# Patient Record
Sex: MALE | Race: WHITE | Employment: FULL TIME | URBAN - METROPOLITAN AREA
[De-identification: names, ages, dates, MRNs, and addresses within clinical notes are randomized per-mention and may not be internally consistent; named-entity substitution may affect disease eponyms.]

---

## 2017-01-03 RX ORDER — HYDROCODONE BITARTRATE AND ACETAMINOPHEN 10; 325 MG/1; MG/1
TABLET ORAL
Qty: 60 TABLET | Refills: 0 | Status: SHIPPED | OUTPATIENT
Start: 2017-01-03 | End: 2017-01-25 | Stop reason: SDUPTHER

## 2017-01-03 RX ORDER — TESTOSTERONE 30 MG/1.5ML
SOLUTION TOPICAL
Qty: 180 G | Refills: 3 | Status: SHIPPED | OUTPATIENT
Start: 2017-01-03

## 2017-01-17 RX ORDER — ZALEPLON 10 MG/1
10 CAPSULE ORAL NIGHTLY PRN
Qty: 15 CAPSULE | Refills: 1 | Status: SHIPPED | OUTPATIENT
Start: 2017-01-17 | End: 2018-07-30

## 2017-01-25 RX ORDER — HYDROCODONE BITARTRATE AND ACETAMINOPHEN 10; 325 MG/1; MG/1
TABLET ORAL
Qty: 60 TABLET | Refills: 0 | Status: SHIPPED | OUTPATIENT
Start: 2017-01-25 | End: 2017-02-21 | Stop reason: SDUPTHER

## 2017-01-26 ENCOUNTER — TELEPHONE (OUTPATIENT)
Dept: FAMILY MEDICINE CLINIC | Age: 47
End: 2017-01-26

## 2017-03-14 RX ORDER — HYDROCODONE BITARTRATE AND ACETAMINOPHEN 10; 325 MG/1; MG/1
TABLET ORAL
Qty: 60 TABLET | Refills: 0 | Status: SHIPPED | OUTPATIENT
Start: 2017-03-14 | End: 2018-01-11 | Stop reason: SDUPTHER

## 2017-03-22 ENCOUNTER — OFFICE VISIT (OUTPATIENT)
Dept: FAMILY MEDICINE CLINIC | Age: 47
End: 2017-03-22

## 2017-03-22 VITALS
WEIGHT: 135 LBS | OXYGEN SATURATION: 97 % | BODY MASS INDEX: 22.49 KG/M2 | HEART RATE: 68 BPM | RESPIRATION RATE: 16 BRPM | HEIGHT: 65 IN | SYSTOLIC BLOOD PRESSURE: 114 MMHG | DIASTOLIC BLOOD PRESSURE: 70 MMHG

## 2017-03-22 DIAGNOSIS — K58.0 IRRITABLE BOWEL SYNDROME WITH DIARRHEA: ICD-10-CM

## 2017-03-22 DIAGNOSIS — G44.221 CHRONIC TENSION-TYPE HEADACHE, INTRACTABLE: ICD-10-CM

## 2017-03-22 DIAGNOSIS — G43.009 MIGRAINE WITHOUT AURA AND WITHOUT STATUS MIGRAINOSUS, NOT INTRACTABLE: Primary | ICD-10-CM

## 2017-03-22 PROCEDURE — 99999 PR OFFICE/OUTPT VISIT,PROCEDURE ONLY: CPT | Performed by: FAMILY MEDICINE

## 2017-03-22 RX ORDER — SUMATRIPTAN 25 MG/1
12.5 TABLET, FILM COATED ORAL 2 TIMES DAILY PRN
Qty: 9 TABLET | Refills: 3 | Status: SHIPPED | OUTPATIENT
Start: 2017-03-22 | End: 2018-07-30

## 2017-03-22 ASSESSMENT — ENCOUNTER SYMPTOMS
VISUAL CHANGE: 0
SINUS PRESSURE: 0
SHORTNESS OF BREATH: 0
CONSTIPATION: 0
PHOTOPHOBIA: 1
EYE PAIN: 1
COUGH: 0
VOMITING: 0
NAUSEA: 0
SORE THROAT: 0
BLURRED VISION: 0
TROUBLE SWALLOWING: 0
WHEEZING: 0
DIARRHEA: 1

## 2017-11-15 PROBLEM — M51.26 DISPLACEMENT OF LUMBAR INTERVERTEBRAL DISC WITHOUT MYELOPATHY: Status: ACTIVE | Noted: 2017-11-15

## 2018-07-30 ENCOUNTER — HOSPITAL ENCOUNTER (EMERGENCY)
Age: 48
Discharge: HOME | End: 2018-07-30
Attending: EMERGENCY MEDICINE
Payer: COMMERCIAL

## 2018-07-30 VITALS
SYSTOLIC BLOOD PRESSURE: 111 MMHG | DIASTOLIC BLOOD PRESSURE: 81 MMHG | RESPIRATION RATE: 14 BRPM | HEART RATE: 65 BPM | WEIGHT: 135 LBS | TEMPERATURE: 97.8 F | HEIGHT: 66 IN | OXYGEN SATURATION: 100 % | BODY MASS INDEX: 21.69 KG/M2

## 2018-07-30 DIAGNOSIS — T40.601A OPIATE OVERDOSE, ACCIDENTAL OR UNINTENTIONAL, INITIAL ENCOUNTER (HCC): ICD-10-CM

## 2018-07-30 DIAGNOSIS — R42 DIZZINESS: ICD-10-CM

## 2018-07-30 DIAGNOSIS — Z98.890 HISTORY OF BACK SURGERY: Primary | ICD-10-CM

## 2018-07-30 DIAGNOSIS — R55 SYNCOPE, UNSPECIFIED SYNCOPE TYPE: ICD-10-CM

## 2018-07-30 LAB
A/G RATIO: 1.5 (ref 1.1–2.2)
ALBUMIN SERPL-MCNC: 4.3 G/DL (ref 3.4–5)
ALP BLD-CCNC: 61 U/L (ref 40–129)
ALT SERPL-CCNC: 19 U/L (ref 10–40)
ANION GAP SERPL CALCULATED.3IONS-SCNC: 12 MMOL/L (ref 3–16)
AST SERPL-CCNC: 22 U/L (ref 15–37)
BASOPHILS ABSOLUTE: 0.1 K/UL (ref 0–0.2)
BASOPHILS RELATIVE PERCENT: 0.6 %
BILIRUB SERPL-MCNC: 0.3 MG/DL (ref 0–1)
BUN BLDV-MCNC: 16 MG/DL (ref 7–20)
CALCIUM SERPL-MCNC: 9.4 MG/DL (ref 8.3–10.6)
CHLORIDE BLD-SCNC: 99 MMOL/L (ref 99–110)
CO2: 27 MMOL/L (ref 21–32)
CREAT SERPL-MCNC: 0.8 MG/DL (ref 0.9–1.3)
EKG ATRIAL RATE: 49 BPM
EKG DIAGNOSIS: NORMAL
EKG P AXIS: 40 DEGREES
EKG P-R INTERVAL: 150 MS
EKG Q-T INTERVAL: 400 MS
EKG QRS DURATION: 84 MS
EKG QTC CALCULATION (BAZETT): 361 MS
EKG R AXIS: 81 DEGREES
EKG T AXIS: 60 DEGREES
EKG VENTRICULAR RATE: 49 BPM
EOSINOPHILS ABSOLUTE: 0.3 K/UL (ref 0–0.6)
EOSINOPHILS RELATIVE PERCENT: 1.9 %
GFR AFRICAN AMERICAN: >60
GFR NON-AFRICAN AMERICAN: >60
GLOBULIN: 2.8 G/DL
GLUCOSE BLD-MCNC: 108 MG/DL (ref 70–99)
HCT VFR BLD CALC: 45.1 % (ref 40.5–52.5)
HEMOGLOBIN: 15.8 G/DL (ref 13.5–17.5)
LYMPHOCYTES ABSOLUTE: 3.3 K/UL (ref 1–5.1)
LYMPHOCYTES RELATIVE PERCENT: 24.7 %
MCH RBC QN AUTO: 30.4 PG (ref 26–34)
MCHC RBC AUTO-ENTMCNC: 35.1 G/DL (ref 31–36)
MCV RBC AUTO: 86.7 FL (ref 80–100)
MONOCYTES ABSOLUTE: 1 K/UL (ref 0–1.3)
MONOCYTES RELATIVE PERCENT: 7.5 %
NEUTROPHILS ABSOLUTE: 8.7 K/UL (ref 1.7–7.7)
NEUTROPHILS RELATIVE PERCENT: 65.3 %
PDW BLD-RTO: 13.1 % (ref 12.4–15.4)
PLATELET # BLD: 215 K/UL (ref 135–450)
PMV BLD AUTO: 8 FL (ref 5–10.5)
POTASSIUM SERPL-SCNC: 3.7 MMOL/L (ref 3.5–5.1)
RBC # BLD: 5.21 M/UL (ref 4.2–5.9)
SODIUM BLD-SCNC: 138 MMOL/L (ref 136–145)
SPECIMEN STATUS: NORMAL
TOTAL PROTEIN: 7.1 G/DL (ref 6.4–8.2)
TROPONIN: <0.01 NG/ML
WBC # BLD: 13.3 K/UL (ref 4–11)

## 2018-07-30 PROCEDURE — 2580000003 HC RX 258: Performed by: EMERGENCY MEDICINE

## 2018-07-30 PROCEDURE — 96374 THER/PROPH/DIAG INJ IV PUSH: CPT

## 2018-07-30 PROCEDURE — 84484 ASSAY OF TROPONIN QUANT: CPT

## 2018-07-30 PROCEDURE — 6360000002 HC RX W HCPCS: Performed by: EMERGENCY MEDICINE

## 2018-07-30 PROCEDURE — 99283 EMERGENCY DEPT VISIT LOW MDM: CPT

## 2018-07-30 PROCEDURE — 96361 HYDRATE IV INFUSION ADD-ON: CPT

## 2018-07-30 PROCEDURE — 93005 ELECTROCARDIOGRAM TRACING: CPT | Performed by: EMERGENCY MEDICINE

## 2018-07-30 PROCEDURE — 85025 COMPLETE CBC W/AUTO DIFF WBC: CPT

## 2018-07-30 PROCEDURE — 93010 ELECTROCARDIOGRAM REPORT: CPT | Performed by: INTERNAL MEDICINE

## 2018-07-30 PROCEDURE — 80053 COMPREHEN METABOLIC PANEL: CPT

## 2018-07-30 RX ORDER — ONDANSETRON 2 MG/ML
4 INJECTION INTRAMUSCULAR; INTRAVENOUS ONCE
Status: COMPLETED | OUTPATIENT
Start: 2018-07-30 | End: 2018-07-30

## 2018-07-30 RX ORDER — ONDANSETRON 4 MG/1
4 TABLET, ORALLY DISINTEGRATING ORAL EVERY 8 HOURS PRN
Qty: 16 TABLET | Refills: 0 | Status: SHIPPED | OUTPATIENT
Start: 2018-07-30

## 2018-07-30 RX ORDER — 0.9 % SODIUM CHLORIDE 0.9 %
1000 INTRAVENOUS SOLUTION INTRAVENOUS ONCE
Status: COMPLETED | OUTPATIENT
Start: 2018-07-30 | End: 2018-07-30

## 2018-07-30 RX ADMIN — SODIUM CHLORIDE 1000 ML: 9 INJECTION, SOLUTION INTRAVENOUS at 20:11

## 2018-07-30 RX ADMIN — ONDANSETRON 4 MG: 2 SOLUTION INTRAMUSCULAR; INTRAVENOUS at 20:11

## 2018-07-30 ASSESSMENT — PAIN DESCRIPTION - ORIENTATION: ORIENTATION: LOWER

## 2018-07-30 ASSESSMENT — PAIN SCALES - GENERAL
PAINLEVEL_OUTOF10: 7
PAINLEVEL_OUTOF10: 4

## 2018-07-30 ASSESSMENT — PAIN DESCRIPTION - PAIN TYPE: TYPE: ACUTE PAIN

## 2018-07-30 ASSESSMENT — PAIN DESCRIPTION - LOCATION: LOCATION: BACK

## 2018-07-31 NOTE — ED PROVIDER NOTES
CHIEF COMPLAINT  Post-op Problem (pt had something done to his back today (stem cell) and was given medication. pt states that he has been passing out. )      HISTORY OF PRESENT ILLNESS  Armida Montes is a 53 y.o. male presents to the ED with low back pain and passing out episode this evening, pt states he is a , and was afraid he might have overdosed on his prescribed meds, because he has seen that before, wife here w/ him, she states he had an episode where he was difficult to wake up and seemed like he was having trouble breathing, just had a stem cell procedure on his lumbar spine today, hx of DDD, given percocet 10s to take scheduled and dilaudid 2mg PO to take for breakthrough pain . Still having significant back pain which he expected post procedure, no bowel/bladder incontinence, no urinary retention, no saddle parasthesia, has been ambulating w/ pain, also feeling nauseated, but has not vomited, No other complaints, modifying factors or associated symptoms. I have reviewed the following from the nursing documentation. Past Medical History:   Diagnosis Date    DDD (degenerative disc disease), lumbosacral 1/17/2013    Environmental allergies     Headache(784.0)     Irritable bowel syndrome with diarrhea 3/10/2016    Seasonal allergies 4/27/2011     Past Surgical History:   Procedure Laterality Date    APPENDECTOMY      SHOULDER SURGERY      Right      History reviewed. No pertinent family history. Social History     Social History    Marital status:      Spouse name: N/A    Number of children: N/A    Years of education: N/A     Occupational History    Not on file.      Social History Main Topics    Smoking status: Former Smoker     Types: Cigarettes     Quit date: 8/31/2011    Smokeless tobacco: Current User      Comment: Smokeless tobacco used occasionally     Alcohol use Yes      Comment: occasionally    Drug use: No    Sexual activity: Yes     Other Topics Concern  Not on file     Social History Narrative    No narrative on file     No current facility-administered medications for this encounter. Current Outpatient Prescriptions   Medication Sig Dispense Refill    ondansetron (ZOFRAN ODT) 4 MG disintegrating tablet Take 1 tablet by mouth every 8 hours as needed for Nausea or Vomiting 16 tablet 0    HYDROmorphone (DILAUDID) 2 MG tablet Take 1 tablet by mouth 3 times daily as needed for Pain for up to 7 days. . 21 tablet 0    oxyCODONE-acetaminophen (PERCOCET)  MG per tablet Take 1 tablet by mouth 2 times daily as needed for Pain for up to 18 days. . 36 tablet 0    Testosterone 30 MG/ACT SOLN APPLY TWO CUPS TO EACH UNDERARM ONCE DAILY 180 g 3     Allergies   Allergen Reactions    Sulfa Antibiotics      Hives       REVIEW OF SYSTEMS  10 systems reviewed, pertinent positives per HPI otherwise noted to be negative. PHYSICAL EXAM  /81   Pulse 65   Temp 97.8 °F (36.6 °C) (Oral)   Resp 14   Ht 5' 6\" (1.676 m)   Wt 135 lb (61.2 kg)   SpO2 100%   BMI 21.79 kg/m²   GENERAL APPEARANCE: Awake and alert. Cooperative. No acute distress  HEAD: Normocephalic. Atraumatic. EYES: PERRL. EOM's grossly intact. ENT: Mucous membranes are moist.   NECK: Supple. HEART: RRR. Borderline kristan around 60, No murmurs  LUNGS: Respirations nonlabored. Lungs are CTAB. ABDOMEN: Soft. Non-distended. Non-tender. No guarding or rebound. Normal bowel sounds. EXTREMITIES: No peripheral edema. Moves all extremities equally. All extremities neurovascularly intact. SKIN: Warm and dry. No acute rashes. Back dressing CDI, no bleeding, TTP over lower lumbar spine  NEUROLOGICAL: Alert and oriented. No gross facial drooping. Strength 5/5, sensation intact. No truncal ataxia. nml speech, CN 2-12 grossly intact, finger to nose testing nml b/l. Back: pain w/ any ROM of lumbar spine, TTP over surgical site  PSYCHIATRIC: Normal mood and affect. Pt appears irritable.     LABS  I have  0.9 % sodium chloride bolus    ondansetron (ZOFRAN) injection 4 mg    ondansetron (ZOFRAN ODT) 4 MG disintegrating tablet     Sig: Take 1 tablet by mouth every 8 hours as needed for Nausea or Vomiting     Dispense:  16 tablet     Refill:  0     Plan of care discussed with patient and family. Patient and family in agreement with plan. Patient was given scripts for the following medications. I counseled patient how to take these medications. Discharge Medication List as of 7/30/2018 10:13 PM      START taking these medications    Details   ondansetron (ZOFRAN ODT) 4 MG disintegrating tablet Take 1 tablet by mouth every 8 hours as needed for Nausea or Vomiting, Disp-16 tablet, R-0Print           CLINICAL IMPRESSION  1. History of back surgery    2. Dizziness    3. Syncope, unspecified syncope type    4. Opiate overdose, accidental or unintentional, initial encounter        Blood pressure 111/81, pulse 65, temperature 97.8 °F (36.6 °C), temperature source Oral, resp. rate 14, height 5' 6\" (1.676 m), weight 135 lb (61.2 kg), SpO2 100 %. Jennifer Ta was discharged to home in stable condition.                   Osker Guard, DO  08/13/18 [id number]

## 2019-06-11 ENCOUNTER — OFFICE VISIT (OUTPATIENT)
Dept: ORTHOPEDIC SURGERY | Age: 49
End: 2019-06-11
Payer: COMMERCIAL

## 2019-06-11 VITALS — RESPIRATION RATE: 14 BRPM | WEIGHT: 135 LBS | HEIGHT: 65 IN | BODY MASS INDEX: 22.49 KG/M2

## 2019-06-11 DIAGNOSIS — M54.50 PAIN OF LUMBAR SPINE: ICD-10-CM

## 2019-06-11 DIAGNOSIS — M47.816 SPONDYLOSIS WITHOUT MYELOPATHY OR RADICULOPATHY, LUMBAR REGION: ICD-10-CM

## 2019-06-11 DIAGNOSIS — S39.012A LUMBAR STRAIN, INITIAL ENCOUNTER: ICD-10-CM

## 2019-06-11 DIAGNOSIS — M51.26 HNP (HERNIATED NUCLEUS PULPOSUS), LUMBAR: Primary | ICD-10-CM

## 2019-06-11 PROBLEM — M51.36 BULGING LUMBAR DISC: Status: ACTIVE | Noted: 2018-05-08

## 2019-06-11 PROBLEM — M51.369 BULGING LUMBAR DISC: Status: ACTIVE | Noted: 2018-05-08

## 2019-06-11 PROCEDURE — 99203 OFFICE O/P NEW LOW 30 MIN: CPT | Performed by: PHYSICAL MEDICINE & REHABILITATION

## 2019-06-11 RX ORDER — MELOXICAM 15 MG/1
15 TABLET ORAL DAILY
Qty: 30 TABLET | Refills: 0 | Status: CANCELLED | OUTPATIENT
Start: 2019-06-11

## 2019-06-11 RX ORDER — NAPROXEN 500 MG/1
500 TABLET ORAL 2 TIMES DAILY PRN
Qty: 60 TABLET | Refills: 0 | Status: SHIPPED | OUTPATIENT
Start: 2019-06-11 | End: 2019-07-08 | Stop reason: SDUPTHER

## 2019-06-11 RX ORDER — TEMAZEPAM 15 MG/1
15 CAPSULE ORAL
COMMUNITY
Start: 2018-12-17

## 2019-06-11 RX ORDER — CYCLOBENZAPRINE HCL 10 MG
10 TABLET ORAL EVERY EVENING
Qty: 30 TABLET | Refills: 0 | Status: CANCELLED | OUTPATIENT
Start: 2019-06-11 | End: 2019-07-11

## 2019-06-11 RX ORDER — SUMATRIPTAN 25 MG/1
TABLET, FILM COATED ORAL
COMMUNITY
Start: 2017-03-22

## 2019-06-11 RX ORDER — CYCLOBENZAPRINE HCL 10 MG
10 TABLET ORAL NIGHTLY PRN
Qty: 30 TABLET | Refills: 0 | Status: SHIPPED | OUTPATIENT
Start: 2019-06-11 | End: 2019-06-21

## 2019-06-11 NOTE — PROGRESS NOTES
New Patient: SPINE    Referring Provider:  No ref. provider found    Chief Complaint   Patient presents with    Lower Back Pain     Lower right side        HISTORY OF PRESENT ILLNESS:      · The patient is being sent at the request of No ref. provider found in consultation as a new spine patient for low back pain The patient is a 53 y.o. male whom reports a week and a half of pain. He describes that he felt pain in the lower back when he was moving boxes and pulling objects. He saw Dr. Deanne Davis, and he did not do anything for the pain last week. He describes that he is having a problem straightening the body. He did not have a fall when the pain began. He describes the pain to be an aching pain over the right side of the lumbar spine. His pain has been persistent and constant over the past week and a half. Aggravating factors include bending, standing, and sitting. This has limited his behavior. Relieving factors include ice, heat, and rest.    · He has had a stem cell treatment last fall in the lumbar spine. He has not been treated for this newer pain since the injury.      Pain Assessment  Location of Pain: Back  Location Modifiers: Left, Inferior  Severity of Pain: 7  Quality of Pain: Aching(Radiating, Stabbing)  Duration of Pain: Persistent  Frequency of Pain: Constant  Date Pain First Started: 06/04/19  Aggravating Factors: Bending, Standing, Other (Comment)(Sitting)  Limiting Behavior: Yes  Relieving Factors: Ice, Heat, Rest  Result of Injury: No  Work-Related Injury: No  Are there other pain locations you wish to document?: No      Associated signs and symptoms:   Neurogenic bowel or bladder symptoms:  no   Perceived weakness:  no   Difficulty walking:  yes    Recent Imaging (within past one year)   Xrays: no   MRI or CT of spine: yes    Current/Past Treatment:   · Physical Therapy:  none  · Chiropractic:  none  · Injection:  yes stem cell therapy   · Medications:   NSAIDS:  yes, Ibuprofen and Tylenol Muscle relaxer:  none   Steriods:  none   Neuropathic medications:  none   Opioids:  Oxycodone 5-325 TID  · Previous surgery:  no  · Previous surgical consult:  no  · Other:  · Infection control  · Tested positive for MRSA in past 12 months:  no  · Tested positive for MSSA \"staph infection\" in past 12 months: no  · Tested positive for VRE (Vancomycin Resistant Enterococci) in past 12 months:   no  · Currently on any antibiotics for an infection: no  · Anticoagulants:  · On a blood thinner:  no   · Any history of bleeding disorder: no   · MRI Contraindication: no   · Previous Pain Management: no             Past Medical History:   Past Medical History:   Diagnosis Date    DDD (degenerative disc disease), lumbosacral 1/17/2013    Environmental allergies     Headache(784.0)     Irritable bowel syndrome with diarrhea 3/10/2016    Seasonal allergies 4/27/2011      Past Surgical History:     Past Surgical History:   Procedure Laterality Date    APPENDECTOMY      SHOULDER SURGERY      Right      Current Medications:     Current Outpatient Medications:     temazepam (RESTORIL) 15 MG capsule, Take 15 mg by mouth., Disp: , Rfl:     SUMAtriptan (IMITREX) 25 MG tablet, Take by mouth, Disp: , Rfl:     naproxen (NAPROSYN) 500 MG tablet, Take 1 tablet by mouth 2 times daily as needed for Pain, Disp: 60 tablet, Rfl: 0    cyclobenzaprine (FLEXERIL) 10 MG tablet, Take 1 tablet by mouth nightly as needed for Muscle spasms, Disp: 30 tablet, Rfl: 0    oxyCODONE-acetaminophen (PERCOCET) 5-325 MG per tablet, Take 1 tablet by mouth 3 times daily as needed for Pain for up to 30 days. , Disp: 90 tablet, Rfl: 0    [START ON 6/27/2019] oxyCODONE-acetaminophen (PERCOCET) 5-325 MG per tablet, Take 1 tablet by mouth 3 times daily as needed for Pain for up to 30 days. , Disp: 90 tablet, Rfl: 0    ondansetron (ZOFRAN ODT) 4 MG disintegrating tablet, Take 1 tablet by mouth every 8 hours as needed for Nausea or Vomiting, Disp: 16 tablet, Rfl: 0    Testosterone 30 MG/ACT SOLN, APPLY TWO CUPS TO EACH UNDERARM ONCE DAILY, Disp: 180 g, Rfl: 3  Allergies:  Sulfa antibiotics  Social History:    reports that he quit smoking about 7 years ago. His smoking use included cigarettes. He uses smokeless tobacco. He reports that he drinks alcohol. He reports that he does not use drugs. Family History:   History reviewed. No pertinent family history. REVIEW OF SYSTEMS: Full ROS reviewed & scanned from 6/11/2019           PHYSICAL EXAM:    Vitals: Resp. rate 14, height 5' 5\" (1.651 m), weight 135 lb (61.2 kg). GENERAL EXAM:  · General Apparence: Patient is adequately groomed with no evidence of malnutrition. · Psychiatric: Orientation: The patient is oriented to time, place and person. The patient's mood and affect are appropriate   · Vascular: Examination reveals no swelling and palpation reveals no tenderness in upper or lower extremities. Good capillary refill. · The lymphatic examination of the neck, axillae and groin reveals all areas to be without enlargement or induration   Sensation is intact without deficit in the upper and lower extremities to light touch and pinprick  · Coordination of the upper and lower extremities are normal.  · RIGHT UPPER EXTREMITY:  Inspection/examination of the right upper extremity does not show any tenderness, deformity or injury. Range of motion is normal and pain-free. There is no gross instability. There are no rashes, ulcerations or lesions. Strength and tone are normal. No atrophy or abnormal movements are noted. · LEFT UPPER EXTREMITY: Inspection/examination of the left upper extremity does not show any tenderness, deformity or injury. Range of motion is normal and pain-free. There is no gross instability. There are no rashes, ulcerations or lesions. Strength and tone are normal. No atrophy or abnormal movements are noted.     LUMBAR/SACRAL EXAMINATION:  · Inspection: Local inspection shows no step-off or bruising. Lumbar alignment is normal. No instability is noted. · Palpation:   No evidence of tenderness at the midline. Lumbar paraspinal tenderness Mild L4/5 and L5/S1 tenderness  Bursal tenderness No tenderness bilaterally  There is no paraspinal spasm. · Range of Motion: limited by 25% in all planes due to pain with flexion and right side bending. · Strength:   Strength testing is 5/5 in all muscle groups tested. · Special Tests:   Straight leg raise positive right and negative left and crossed SLR positive left with right sided leg symptoms. Sebastian's testing is negative bilaterally. FADIR's testing is negative bilaterally. · Skin: There are no rashes, ulcerations or lesions. · Reflexes: Reflexes are symmetrically 2+ at the patellar and ankle tendons. Clonus absent bilaterally at the feet. · Gait & station: normal, patient ambulates without assistance  · Additional Examinations:  · RIGHT LOWER EXTREMITY: Inspection/examination of the right lower extremity does not show any tenderness, deformity or injury. Range of motion is unremarkable. There is no gross instability. There are no rashes, ulcerations or lesions. Strength and tone are normal. No atrophy or abnormal movements are noted. · LEFT LOWER EXTREMITY:  Inspection/examination of the left lower extremity does not show any tenderness, deformity or injury. Range of motion is unremarkable. There is no gross instability. There are no rashes, ulcerations or lesions. Strength and tone are normal. No atrophy or abnormal movements are noted. Diagnostic Testing:    Xrays:   AP and lateral of the lumbar spine taken today in the office show L4-5 5 S1 mild degenerative disc disease    MRI or CT:  MRI of the Lumbar Spine from 4/9/18   CONCLUSION:    1.  Concentric disc displacement with annular rent and medializing facet hypertrophy mildly    narrow the L5-S1 and L4-5 lateral recesses with encroachment/effacement of the descending S1    and L5 nerve roots bilaterally. Mild bilateral L5-S1 foraminal stenosis with L5 root    effacement. 2. L3-4 Left foraminal disc protrusion/herniation with annular rent and facet hypertrophy    moderately narrows the left root foramen with L3 root effacement. Mild right foraminal stenosis    with L3 root effacement. Shallow concentric disc displacement and medializing facet    hypertrophy mildly narrow the lateral recesses with encroachment of the descending L4 nerve    roots bilaterally. 3. Multilevel facet hypertrophy with capsulitis most conspicuous at L5-S1 and L4-5.       EMG:  None  Results for orders placed or performed during the hospital encounter of 07/30/18   CBC auto differential   Result Value Ref Range    WBC 13.3 (H) 4.0 - 11.0 K/uL    RBC 5.21 4.20 - 5.90 M/uL    Hemoglobin 15.8 13.5 - 17.5 g/dL    Hematocrit 45.1 40.5 - 52.5 %    MCV 86.7 80.0 - 100.0 fL    MCH 30.4 26.0 - 34.0 pg    MCHC 35.1 31.0 - 36.0 g/dL    RDW 13.1 12.4 - 15.4 %    Platelets 341 244 - 097 K/uL    MPV 8.0 5.0 - 10.5 fL    Neutrophils % 65.3 %    Lymphocytes % 24.7 %    Monocytes % 7.5 %    Eosinophils % 1.9 %    Basophils % 0.6 %    Neutrophils # 8.7 (H) 1.7 - 7.7 K/uL    Lymphocytes # 3.3 1.0 - 5.1 K/uL    Monocytes # 1.0 0.0 - 1.3 K/uL    Eosinophils # 0.3 0.0 - 0.6 K/uL    Basophils # 0.1 0.0 - 0.2 K/uL   Comprehensive metabolic panel   Result Value Ref Range    Sodium 138 136 - 145 mmol/L    Potassium 3.7 3.5 - 5.1 mmol/L    Chloride 99 99 - 110 mmol/L    CO2 27 21 - 32 mmol/L    Anion Gap 12 3 - 16    Glucose 108 (H) 70 - 99 mg/dL    BUN 16 7 - 20 mg/dL    CREATININE 0.8 (L) 0.9 - 1.3 mg/dL    GFR Non-African American >60 >60    GFR African American >60 >60    Calcium 9.4 8.3 - 10.6 mg/dL    Total Protein 7.1 6.4 - 8.2 g/dL    Alb 4.3 3.4 - 5.0 g/dL    Albumin/Globulin Ratio 1.5 1.1 - 2.2    Total Bilirubin 0.3 0.0 - 1.0 mg/dL    Alkaline Phosphatase 61 40 - 129 U/L    ALT 19 10 - 40 U/L    AST 22 15 - 37 U/L    Globulin further imaging is obtained, interventional options will be reviewed and recommended. 5. Healthy Lifestyle Measures:  Patient education material reviewing the following was distributed to Jace Rome  Anatomic drawings  Healthy lifestyle education  Osteoporosis prevention,   Back and neck pain educational information   Advanced imaging preparedness    Posture education   Proper lifting and carrying techniques,   Weight management  Quitting smoking and   Minor ways to treat back pain  For further information regarding the spine conditions and to review interventional treatments the patient was directed to Jana Mobile.    6.  Follow up:  1-2 weeks    Jace Rome was instructed to call the office if his symptoms worsen or if new symptoms appear prior to the next scheduled visit. He is specifically instructed to contact the office between now & his scheduled appointment if he has concerns related to his condition or if he needs assistance in scheduling the above tests. He is welcome to call for an appointment sooner if he has any additional concerns or questions. Ting Cabrera. Francisca Cortes MD, SELMA, [facility]  Board Certified in [address] [place] [address] Certified and Fellowship Trained in [facility] ([facility])     This dictation was performed with a verbal recognition program [facility] ) and it was checked for errors. It is possible that there are still dictated errors within this office note. If so, please bring any errors to my attention for an addendum. All efforts were made to ensure that this office note is accurate.

## 2019-06-12 ENCOUNTER — TELEPHONE (OUTPATIENT)
Dept: ORTHOPEDIC SURGERY | Age: 49
End: 2019-06-12

## 2019-06-12 NOTE — TELEPHONE ENCOUNTER
S/W PATIENT and explained that everything was re-faxed to [address]. Scheduled patient for FUA and reiterated that MRI results are not discussed via phone as previously instructed. Patient voiced understanding and will contact the office with further questions or concerns.

## 2019-06-12 NOTE — TELEPHONE ENCOUNTER
PT CALLED AND SAID HE CALLED [facility] TO SCHEDULE MRI BUT THEY DO NOT HAVE THE ORDER SO CAN THE ORDER BE REFAXED TO THEM SO THAT HE CAN GO AHEAD AND SCHEDULE. FAX # [phone]. PLEASE CALL PT WHEN ORDER HAS BEEN FAXED AND HE CAN BE REACHED [phone]. THANK YOU.

## 2019-06-19 ENCOUNTER — OFFICE VISIT (OUTPATIENT)
Dept: ORTHOPEDIC SURGERY | Age: 49
End: 2019-06-19
Payer: COMMERCIAL

## 2019-06-19 DIAGNOSIS — M51.26 HNP (HERNIATED NUCLEUS PULPOSUS), LUMBAR: Primary | ICD-10-CM

## 2019-06-19 DIAGNOSIS — G57.01 PIRIFORMIS SYNDROME OF RIGHT SIDE: ICD-10-CM

## 2019-06-19 DIAGNOSIS — M47.816 SPONDYLOSIS WITHOUT MYELOPATHY OR RADICULOPATHY, LUMBAR REGION: ICD-10-CM

## 2019-06-19 PROCEDURE — 99213 OFFICE O/P EST LOW 20 MIN: CPT | Performed by: PHYSICIAN ASSISTANT

## 2019-06-19 RX ORDER — TIZANIDINE 4 MG/1
2 TABLET ORAL NIGHTLY
Qty: 15 TABLET | Refills: 0 | Status: SHIPPED | OUTPATIENT
Start: 2019-06-19 | End: 2019-07-19

## 2019-06-19 NOTE — PROGRESS NOTES
Procedure Laterality Date    APPENDECTOMY      SHOULDER SURGERY      Right      Current Medications:     Current Outpatient Medications:     temazepam (RESTORIL) 15 MG capsule, Take 15 mg by mouth., Disp: , Rfl:     SUMAtriptan (IMITREX) 25 MG tablet, Take by mouth, Disp: , Rfl:     ondansetron (ZOFRAN ODT) 4 MG disintegrating tablet, Take 1 tablet by mouth every 8 hours as needed for Nausea or Vomiting, Disp: 16 tablet, Rfl: 0    Testosterone 30 MG/ACT SOLN, APPLY TWO CUPS TO EACH UNDERARM ONCE DAILY, Disp: 180 g, Rfl: 3    oxyCODONE-acetaminophen (PERCOCET) 5-325 MG per tablet, Take 1 tablet by mouth 3 times daily as needed for Pain for up to 30 days. , Disp: 90 tablet, Rfl: 0    [START ON 8/15/2019] oxyCODONE-acetaminophen (PERCOCET) 5-325 MG per tablet, Take 1 tablet by mouth 3 times daily as needed for Pain for up to 30 days. , Disp: 90 tablet, Rfl: 0    naproxen (NAPROSYN) 500 MG tablet, TAKE ONE TABLET BY MOUTH TWICE A DAY AS NEEDED FOR PAIN, Disp: 60 tablet, Rfl: 0  Allergies:  Sulfa antibiotics  Social History:    reports that he quit smoking about 7 years ago. His smoking use included cigarettes. He uses smokeless tobacco. He reports that he drinks alcohol. He reports that he does not use drugs. Family History:   History reviewed. No pertinent family history. REVIEW OF SYSTEMS:   CONSTITUTIONAL: Denies unexplained weight loss, fevers, chills or fatigue  NEUROLOGICAL: Denies unsteady gait or progressive weakness  MUSCULOSKELETAL: Denies joint swelling or redness  GI: Denies nausea, vomiting, diarrhea   : Denies bowel or bladder issues       PHYSICAL EXAM:    Vitals: Pulse 76, resp. rate 16, height 5' 5\" (1.651 m), weight 134 lb 14.7 oz (61.2 kg). GENERAL EXAM:  · General Apparence: Patient is adequately groomed with no evidence of malnutrition. · Psychiatric: Orientation: The patient is oriented to time, place and person.  The patient's mood and affect are appropriate   · Vascular: assistance  · Additional Examinations:  · RIGHT LOWER EXTREMITY: Inspection/examination of the right lower extremity does not show any tenderness, deformity or injury. Range of motion is normal and pain-free. There is no gross instability. There are no rashes, ulcerations or lesions. Strength and tone are normal. No atrophy or abnormal movements are noted. · LEFT LOWER EXTREMITY:  Inspection/examination of the left lower extremity does not show any tenderness, deformity or injury. Range of motion is normal and pain-free. There is no gross instability. There are no rashes, ulcerations or lesions. Strength and tone are normal. No atrophy or abnormal movements are noted. Diagnostic Testing:    MR Lumbar spine shows : from 6/18/19  CONCLUSION:   1. L4-5 shallow central and right lateral disc protrusion gently deforming right ventral dural    sac and right L5 nerve root.  Moderate spinal stenosis.  Moderate right lateral recess    stenosis.  Though displaced disc is quite shallow, this reflects interval change from    04/09/2018 MRI. 2. L5-S1 mild/moderate spinal stenosis.  Trace retrolisthesis.  Central disc protrusion with    annular fissure.  Unchanged from 04/09/2018 MRI. 3. L3-4 mild spinal stenosis. Disc bulge. Facet hypertrophy. Unchanged from 04/09/2018 MRI.      Results for orders placed or performed during the hospital encounter of 07/30/18   CBC auto differential   Result Value Ref Range    WBC 13.3 (H) 4.0 - 11.0 K/uL    RBC 5.21 4.20 - 5.90 M/uL    Hemoglobin 15.8 13.5 - 17.5 g/dL    Hematocrit 45.1 40.5 - 52.5 %    MCV 86.7 80.0 - 100.0 fL    MCH 30.4 26.0 - 34.0 pg    MCHC 35.1 31.0 - 36.0 g/dL    RDW 13.1 12.4 - 15.4 %    Platelets 717 128 - 515 K/uL    MPV 8.0 5.0 - 10.5 fL    Neutrophils % 65.3 %    Lymphocytes % 24.7 %    Monocytes % 7.5 %    Eosinophils % 1.9 %    Basophils % 0.6 %    Neutrophils # 8.7 (H) 1.7 - 7.7 K/uL    Lymphocytes # 3.3 1.0 - 5.1 K/uL    Monocytes # 1.0 0.0 - 1.3 K/uL interventions. After considering the various options discussed, Angelina Acosta elected to pursue a course of treatment that includes the followin. Medications:  I will add a Zanaflex 2 mg qhs to the current regimen. Counseled on risks, benefits and alternatives and recommended not to take the medicine and drive or operate heavy machinery. The patient gets very drowsy when he takes muscle relaxers, but does wish to take something to help his pain and discomfort at night. I will prescribe a lose dose to start and determine if the patient needs to increase the dosage base on his tolerance to the medication. 2. PT:  Encouraged to continue with HEP. The patient was given lower back and piriformis exercises and stretches. He is going on vacation for the next three weeks, when he returns he will call the office to determine if he would like to proceed with formal PT for his lower back and piriformis. 3. Further studies:  No further studies. 4. Interventional:  No further intervention at this time. 5. Follow up:  4-6 weeks      Angelina Acosta was instructed to call the office if his symptoms worsen or if new symptoms appear prior to the next scheduled visit. He is specifically instructed to contact the office between now & his scheduled appointment if he has concerns related to his condition or if he needs assistance in scheduling the above tests. He is welcome to call for an appointment sooner if he has any additional concerns or questions. Aleksandr Pollard ATC, am scribing for and in the presence of Sophia Abdul PA-C.   18 2:15 PM Staci Morris ATC    I, Sophia Abdul PA-C, personally performed the services described in this documentation as scribed by HAYDEN Radford in my presence and it is both accurate and complete.         MARY Andersen PA-C  Board Certified by the [employer] on Certification of Physician Assistants  [place]

## 2019-07-10 RX ORDER — NAPROXEN 500 MG/1
TABLET ORAL
Qty: 60 TABLET | Refills: 0 | Status: SHIPPED | OUTPATIENT
Start: 2019-07-10

## 2019-07-26 VITALS — WEIGHT: 134.92 LBS | BODY MASS INDEX: 22.48 KG/M2 | HEIGHT: 65 IN | HEART RATE: 76 BPM | RESPIRATION RATE: 16 BRPM

## 2019-11-12 PROBLEM — M47.816 LUMBAR FACET ARTHROPATHY: Status: ACTIVE | Noted: 2019-11-12

## 2019-11-12 PROBLEM — M48.061 SPINAL STENOSIS OF LUMBAR REGION: Status: ACTIVE | Noted: 2019-11-12

## 2025-01-28 ENCOUNTER — APPOINTMENT (OUTPATIENT)
Dept: CT IMAGING | Age: 55
End: 2025-01-28
Payer: COMMERCIAL

## 2025-01-28 ENCOUNTER — HOSPITAL ENCOUNTER (OUTPATIENT)
Age: 55
Setting detail: OBSERVATION
Discharge: HOME | End: 2025-01-29
Attending: EMERGENCY MEDICINE | Admitting: INTERNAL MEDICINE
Payer: COMMERCIAL

## 2025-01-28 DIAGNOSIS — R42 DIZZINESS: Primary | ICD-10-CM

## 2025-01-28 DIAGNOSIS — R29.818 TRANSIENT NEUROLOGICAL SYMPTOMS: ICD-10-CM

## 2025-01-28 PROBLEM — G45.9 TIA (TRANSIENT ISCHEMIC ATTACK): Status: ACTIVE | Noted: 2025-01-28

## 2025-01-28 LAB
ALBUMIN SERPL-MCNC: 4.6 G/DL (ref 3.4–5)
ALBUMIN/GLOB SERPL: 1.7 {RATIO} (ref 1.1–2.2)
ALP SERPL-CCNC: 74 U/L (ref 40–129)
ALT SERPL-CCNC: 21 U/L (ref 10–40)
ANION GAP SERPL CALCULATED.3IONS-SCNC: 8 MMOL/L (ref 3–16)
AST SERPL-CCNC: 21 U/L (ref 15–37)
BASOPHILS # BLD: 0 K/UL (ref 0–0.2)
BASOPHILS NFR BLD: 0.4 %
BILIRUB SERPL-MCNC: 0.4 MG/DL (ref 0–1)
BUN SERPL-MCNC: 13 MG/DL (ref 7–20)
CALCIUM SERPL-MCNC: 10.1 MG/DL (ref 8.3–10.6)
CHLORIDE SERPL-SCNC: 101 MMOL/L (ref 99–110)
CO2 SERPL-SCNC: 30 MMOL/L (ref 21–32)
COHGB MFR BLD: 1.8 % (ref 0–1.5)
CREAT SERPL-MCNC: 1 MG/DL (ref 0.9–1.3)
DEPRECATED RDW RBC AUTO: 12.8 % (ref 12.4–15.4)
EOSINOPHIL # BLD: 0 K/UL (ref 0–0.6)
EOSINOPHIL NFR BLD: 0.5 %
GFR SERPLBLD CREATININE-BSD FMLA CKD-EPI: 90 ML/MIN/{1.73_M2}
GLUCOSE SERPL-MCNC: 111 MG/DL (ref 70–99)
HCT VFR BLD AUTO: 41.8 % (ref 40.5–52.5)
HGB BLD-MCNC: 14.3 G/DL (ref 13.5–17.5)
INR PPP: 1.03 (ref 0.85–1.15)
LYMPHOCYTES # BLD: 1 K/UL (ref 1–5.1)
LYMPHOCYTES NFR BLD: 11.4 %
MCH RBC QN AUTO: 30.7 PG (ref 26–34)
MCHC RBC AUTO-ENTMCNC: 34.3 G/DL (ref 31–36)
MCV RBC AUTO: 89.3 FL (ref 80–100)
MONOCYTES # BLD: 0.4 K/UL (ref 0–1.3)
MONOCYTES NFR BLD: 4.7 %
NEUTROPHILS # BLD: 7.3 K/UL (ref 1.7–7.7)
NEUTROPHILS NFR BLD: 83 %
PLATELET # BLD AUTO: 193 K/UL (ref 135–450)
PMV BLD AUTO: 7.9 FL (ref 5–10.5)
POTASSIUM SERPL-SCNC: 4.6 MMOL/L (ref 3.5–5.1)
PROT SERPL-MCNC: 7.3 G/DL (ref 6.4–8.2)
PROTHROMBIN TIME: 13.8 SEC (ref 11.9–14.9)
RBC # BLD AUTO: 4.68 M/UL (ref 4.2–5.9)
SODIUM SERPL-SCNC: 139 MMOL/L (ref 136–145)
TROPONIN, HIGH SENSITIVITY: <6 NG/L (ref 0–22)
WBC # BLD AUTO: 8.8 K/UL (ref 4–11)

## 2025-01-28 PROCEDURE — 80053 COMPREHEN METABOLIC PANEL: CPT

## 2025-01-28 PROCEDURE — G0378 HOSPITAL OBSERVATION PER HR: HCPCS

## 2025-01-28 PROCEDURE — 99285 EMERGENCY DEPT VISIT HI MDM: CPT

## 2025-01-28 PROCEDURE — 36415 COLL VENOUS BLD VENIPUNCTURE: CPT

## 2025-01-28 PROCEDURE — 84484 ASSAY OF TROPONIN QUANT: CPT

## 2025-01-28 PROCEDURE — 70496 CT ANGIOGRAPHY HEAD: CPT

## 2025-01-28 PROCEDURE — 85610 PROTHROMBIN TIME: CPT

## 2025-01-28 PROCEDURE — 6360000004 HC RX CONTRAST MEDICATION: Performed by: EMERGENCY MEDICINE

## 2025-01-28 PROCEDURE — 70450 CT HEAD/BRAIN W/O DYE: CPT

## 2025-01-28 PROCEDURE — 82375 ASSAY CARBOXYHB QUANT: CPT

## 2025-01-28 PROCEDURE — 85025 COMPLETE CBC W/AUTO DIFF WBC: CPT

## 2025-01-28 PROCEDURE — 93005 ELECTROCARDIOGRAM TRACING: CPT | Performed by: EMERGENCY MEDICINE

## 2025-01-28 RX ORDER — IOPAMIDOL 755 MG/ML
75 INJECTION, SOLUTION INTRAVASCULAR
Status: COMPLETED | OUTPATIENT
Start: 2025-01-28 | End: 2025-01-28

## 2025-01-28 RX ORDER — MECLIZINE HCL 12.5 MG 12.5 MG/1
12.5 TABLET ORAL 3 TIMES DAILY PRN
Status: DISCONTINUED | OUTPATIENT
Start: 2025-01-28 | End: 2025-01-29 | Stop reason: HOSPADM

## 2025-01-28 RX ORDER — ONDANSETRON 2 MG/ML
4 INJECTION INTRAMUSCULAR; INTRAVENOUS ONCE
Status: DISCONTINUED | OUTPATIENT
Start: 2025-01-28 | End: 2025-01-29 | Stop reason: HOSPADM

## 2025-01-28 RX ADMIN — IOPAMIDOL 75 ML: 755 INJECTION, SOLUTION INTRAVENOUS at 18:32

## 2025-01-28 ASSESSMENT — ENCOUNTER SYMPTOMS
NAUSEA: 1
SHORTNESS OF BREATH: 0
VOMITING: 0
SORE THROAT: 0
RHINORRHEA: 0
ABDOMINAL PAIN: 0
EYE REDNESS: 0

## 2025-01-28 ASSESSMENT — LIFESTYLE VARIABLES
HOW OFTEN DO YOU HAVE A DRINK CONTAINING ALCOHOL: NEVER
HOW MANY STANDARD DRINKS CONTAINING ALCOHOL DO YOU HAVE ON A TYPICAL DAY: PATIENT DOES NOT DRINK

## 2025-01-28 ASSESSMENT — PAIN - FUNCTIONAL ASSESSMENT: PAIN_FUNCTIONAL_ASSESSMENT: NONE - DENIES PAIN

## 2025-01-28 NOTE — CONSULTS
Code stroke called @ 1813  CT called @ 1803   stroke team called @ 1814  Lab called @ 1815   stroke team called back @ 1816 (Katarina Daniels MD)

## 2025-01-28 NOTE — ED PROVIDER NOTES
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 States that was around 0600. Was able to get home and go to bed. Says the symptoms have come and gone. Has had a couple episodes of vomiting. States his head feels heavy and he feels pressure in his eyes. Describes the feeling as a room spinning. Reports he's had a couple episodes recently at the gym where he's felt off balance. (Patient reports dizziness is worse when turning his head)     Fatigue     Reports he recently has felt some muscle fatigue and soreness.              HISTORY OF PRESENT ILLNESS:   History from : Patient   Limitations to history : None     Alexandre Cassidy is a 54 y.o. male who presents c/o dizziness.  The patient states prior to 6:00 AM this morning he was in his usual state of health.  He states that he was driving home this morning around 6:00 AM when he noticed he was having a difficult time keeping has vehicle in the appropriate jadon because he just felt like his car kept veering out of the jadon like there might be some mechanical problem.  When he got home he states he was having a difficult time walking into his house.  He states he had to keep grabbing the cars as he walked between them.  He states he went to bed.  He got up this afternoon and while things get better and then get worse and never quite resolved.  He denies any speech or thought abnormalities.  He reports a pressure behind his eyes.  He also reports he had a history of migraines.    Nursing Notes were all reviewed and agreed with, or any

## 2025-01-29 ENCOUNTER — APPOINTMENT (OUTPATIENT)
Dept: MRI IMAGING | Age: 55
End: 2025-01-29
Payer: COMMERCIAL

## 2025-01-29 VITALS
HEIGHT: 65 IN | BODY MASS INDEX: 25.99 KG/M2 | SYSTOLIC BLOOD PRESSURE: 141 MMHG | RESPIRATION RATE: 19 BRPM | OXYGEN SATURATION: 99 % | HEART RATE: 76 BPM | WEIGHT: 156 LBS | DIASTOLIC BLOOD PRESSURE: 86 MMHG | TEMPERATURE: 97.9 F

## 2025-01-29 PROBLEM — R29.818 TRANSIENT NEUROLOGICAL SYMPTOMS: Status: ACTIVE | Noted: 2025-01-29

## 2025-01-29 LAB
EKG ATRIAL RATE: 59 BPM
EKG DIAGNOSIS: NORMAL
EKG P AXIS: 59 DEGREES
EKG P-R INTERVAL: 166 MS
EKG Q-T INTERVAL: 404 MS
EKG QRS DURATION: 84 MS
EKG QTC CALCULATION (BAZETT): 399 MS
EKG R AXIS: 64 DEGREES
EKG T AXIS: 48 DEGREES
EKG VENTRICULAR RATE: 59 BPM

## 2025-01-29 PROCEDURE — G0378 HOSPITAL OBSERVATION PER HR: HCPCS

## 2025-01-29 PROCEDURE — 93010 ELECTROCARDIOGRAM REPORT: CPT | Performed by: INTERNAL MEDICINE

## 2025-01-29 PROCEDURE — 6370000000 HC RX 637 (ALT 250 FOR IP): Performed by: STUDENT IN AN ORGANIZED HEALTH CARE EDUCATION/TRAINING PROGRAM

## 2025-01-29 PROCEDURE — 6370000000 HC RX 637 (ALT 250 FOR IP): Performed by: NURSE PRACTITIONER

## 2025-01-29 PROCEDURE — 99222 1ST HOSP IP/OBS MODERATE 55: CPT | Performed by: STUDENT IN AN ORGANIZED HEALTH CARE EDUCATION/TRAINING PROGRAM

## 2025-01-29 PROCEDURE — APPSS45 APP SPLIT SHARED TIME 31-45 MINUTES

## 2025-01-29 PROCEDURE — 2500000003 HC RX 250 WO HCPCS: Performed by: NURSE PRACTITIONER

## 2025-01-29 PROCEDURE — 70551 MRI BRAIN STEM W/O DYE: CPT

## 2025-01-29 RX ORDER — SODIUM CHLORIDE 0.9 % (FLUSH) 0.9 %
5-40 SYRINGE (ML) INJECTION EVERY 12 HOURS SCHEDULED
Status: DISCONTINUED | OUTPATIENT
Start: 2025-01-29 | End: 2025-01-29 | Stop reason: HOSPADM

## 2025-01-29 RX ORDER — LABETALOL HYDROCHLORIDE 5 MG/ML
10 INJECTION, SOLUTION INTRAVENOUS EVERY 10 MIN PRN
Status: DISCONTINUED | OUTPATIENT
Start: 2025-01-29 | End: 2025-01-29 | Stop reason: HOSPADM

## 2025-01-29 RX ORDER — MECLIZINE HCL 12.5 MG 12.5 MG/1
12.5 TABLET ORAL 3 TIMES DAILY PRN
Qty: 20 TABLET | Refills: 0 | Status: SHIPPED | OUTPATIENT
Start: 2025-01-29 | End: 2025-02-28

## 2025-01-29 RX ORDER — OXYCODONE AND ACETAMINOPHEN 5; 325 MG/1; MG/1
1 TABLET ORAL 3 TIMES DAILY
Status: DISCONTINUED | OUTPATIENT
Start: 2025-01-29 | End: 2025-01-29 | Stop reason: HOSPADM

## 2025-01-29 RX ORDER — ATORVASTATIN CALCIUM 40 MG/1
40 TABLET, FILM COATED ORAL NIGHTLY
Qty: 30 TABLET | Refills: 11 | Status: SHIPPED | OUTPATIENT
Start: 2025-01-29 | End: 2026-01-24

## 2025-01-29 RX ORDER — ATORVASTATIN CALCIUM 40 MG/1
40 TABLET, FILM COATED ORAL NIGHTLY
Status: DISCONTINUED | OUTPATIENT
Start: 2025-01-29 | End: 2025-01-29 | Stop reason: HOSPADM

## 2025-01-29 RX ORDER — TEMAZEPAM 15 MG/1
15 CAPSULE ORAL NIGHTLY PRN
Status: DISCONTINUED | OUTPATIENT
Start: 2025-01-29 | End: 2025-01-29 | Stop reason: HOSPADM

## 2025-01-29 RX ORDER — ASPIRIN 81 MG/1
81 TABLET, CHEWABLE ORAL DAILY
Status: DISCONTINUED | OUTPATIENT
Start: 2025-01-29 | End: 2025-01-29 | Stop reason: HOSPADM

## 2025-01-29 RX ORDER — SODIUM CHLORIDE 0.9 % (FLUSH) 0.9 %
5-40 SYRINGE (ML) INJECTION PRN
Status: DISCONTINUED | OUTPATIENT
Start: 2025-01-29 | End: 2025-01-29 | Stop reason: HOSPADM

## 2025-01-29 RX ORDER — POLYETHYLENE GLYCOL 3350 17 G/17G
17 POWDER, FOR SOLUTION ORAL DAILY PRN
Status: DISCONTINUED | OUTPATIENT
Start: 2025-01-29 | End: 2025-01-29 | Stop reason: HOSPADM

## 2025-01-29 RX ORDER — ASPIRIN 81 MG/1
81 TABLET, CHEWABLE ORAL DAILY
Qty: 90 TABLET | Refills: 3 | Status: SHIPPED | OUTPATIENT
Start: 2025-01-30 | End: 2026-01-25

## 2025-01-29 RX ORDER — ASPIRIN 81 MG/1
162 TABLET, CHEWABLE ORAL
Status: COMPLETED | OUTPATIENT
Start: 2025-01-29 | End: 2025-01-29

## 2025-01-29 RX ORDER — PROCHLORPERAZINE EDISYLATE 5 MG/ML
10 INJECTION INTRAMUSCULAR; INTRAVENOUS EVERY 6 HOURS PRN
Status: DISCONTINUED | OUTPATIENT
Start: 2025-01-29 | End: 2025-01-29 | Stop reason: HOSPADM

## 2025-01-29 RX ORDER — SODIUM CHLORIDE 9 MG/ML
INJECTION, SOLUTION INTRAVENOUS PRN
Status: DISCONTINUED | OUTPATIENT
Start: 2025-01-29 | End: 2025-01-29 | Stop reason: HOSPADM

## 2025-01-29 RX ORDER — ASPIRIN 300 MG/1
300 SUPPOSITORY RECTAL DAILY
Status: DISCONTINUED | OUTPATIENT
Start: 2025-01-29 | End: 2025-01-29 | Stop reason: HOSPADM

## 2025-01-29 RX ADMIN — ASPIRIN 81 MG: 81 TABLET, CHEWABLE ORAL at 08:37

## 2025-01-29 RX ADMIN — OXYCODONE AND ACETAMINOPHEN 1 TABLET: 5; 325 TABLET ORAL at 14:52

## 2025-01-29 RX ADMIN — Medication 10 ML: at 08:39

## 2025-01-29 RX ADMIN — OXYCODONE AND ACETAMINOPHEN 1 TABLET: 5; 325 TABLET ORAL at 08:37

## 2025-01-29 RX ADMIN — ASPIRIN 162 MG: 81 TABLET, CHEWABLE ORAL at 15:36

## 2025-01-29 ASSESSMENT — PAIN DESCRIPTION - ORIENTATION
ORIENTATION: LOWER
ORIENTATION: LOWER

## 2025-01-29 ASSESSMENT — PAIN DESCRIPTION - LOCATION
LOCATION: BACK

## 2025-01-29 ASSESSMENT — PAIN SCALES - GENERAL
PAINLEVEL_OUTOF10: 5
PAINLEVEL_OUTOF10: 5
PAINLEVEL_OUTOF10: 3
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
## 2025-01-29 NOTE — ED NOTES
Alexandre Cassidy is a 54 y.o. male admitted for  Principal Problem:    TIA (transient ischemic attack)  Resolved Problems:    * No resolved hospital problems. *  .   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 States that was around 0600. Was able to get home and go to bed. Says the symptoms have come and gone. Has had a couple episodes of vomiting. States his head feels heavy and he feels pressure in his eyes. Describes the feeling as a room spinning. Reports he's had a couple episodes recently at the gym where he's felt off balance. (Patient reports dizziness is worse when turning his head)     Fatigue     Reports he recently has felt some muscle fatigue and soreness.    .  Patient is alert and Person, Place, Time, and Situation  Patient's baseline mobility: Baseline Mobility: Independent   Code Status: Full Code   Cardiac Rhythm:       Is patient on baseline Oxygen: no how many Liters:   Abnormal Assessment Findings:     Isolation: None      NIH Score:    C-SSRS: Risk of Suicide: No Risk  Bedside swallow:        Active LDA's:   Peripheral IV Right Forearm (Active)     Patient admitted with a ring: no If the ring is chronic was it exchanged:  Reason for ring:   Patient admitted with Central Line:  NA . PICC line placement confirmed: YES OR NO:[id number]}   Reason for Central line:   Was central line Inserted from an outside facility:        Family/Caregiver Present yes Any Concerns:    Restraints no  Sitter no         Vitals: MEWS Score: 1    Vitals:    01/29/25 0530 01/29/25 0645 01/29/25 0813 01/29/25 1100   BP: 112/75 114/82 115/86 123/80   Pulse: 61 61 62 71   Resp: 14 14 21 16   Temp:  97.9 °F (36.6 °C)  97.9 °F (36.6 °C)   TempSrc:  Oral  Oral   SpO2: 100% 96% 98% 96%   Weight:       Height:           Last documented pain score (0-10 scale) Pain Level:

## 2025-01-29 NOTE — DISCHARGE INSTRUCTIONS
Start taking magnesium 400mg once per day and riboflavin 400mg once per day.     ====    Call 911 if you develop sudden onset new neurological signs or symptoms of stroke including the following:     BE FAST Warning Signs  Use the letters in BE FAST to spot a Stroke  B = Balance - Do they have loss of balance or are they dizzy? Are they walking differently?  E = Eyes - Can they see out of both eyes OK? As them if they have sudden vision loss or blurry or double vision.   F = Face Drooping - Does one side of the face droop or is it numb? Ask them to smile. Is the smile uneven?  A = Arm Weakness - Is one arm weak or numb? Ask them to raise both arms. Does one arm drift downward?  S = Speech Difficulty - Is speech slurred?  T = Time to call 911 - Stroke is an emergency. Every minute counts. Call 911 immediately. Note the time when any of the symptoms first appear.    Other Stroke Symptoms  Watch for Sudden:  NUMBNESS or weakness of face, arm, or leg, especially on one side of the body  CONFUSION, trouble speaking or understanding speech  TROUBLE SEEING in one or both eyes  TROUBLE WALKING, dizziness, loss of balance or coordination  SEVERE HEADACHE with no known cause    Learn more about signs and symptoms of stroke at this web site: <https://www.stroke.org/en/about-stroke/stroke-symptoms>.    ===    Here are some behavioral and dietary changes you can make to improve your headaches:    Gradually reduce and ideally stop consuming caffeine. Caffeine use 3 or more days per week is likely to worsen your headache.    Exercise regularly. Try starting out with 30 minutes of aerobic exercise 3 times per week.    Make time to pursue activities that make you feel relaxed and happy. Try to spend at least 20 minutes outdoors (weather permitting). Consider pursuing artistic activities to explore your creative side. Try doing process-driven activities such as cooking and baking.    Make changes to improve your sleep at night (see

## 2025-01-29 NOTE — PROGRESS NOTES
--------------------------------------------------      Medications:        Infusion Medications    sodium chloride       Scheduled Medications    oxyCODONE-acetaminophen  1 tablet Oral TID    sodium chloride flush  5-40 mL IntraVENous 2 times per day    aspirin  81 mg Oral Daily    Or    aspirin  300 mg Rectal Daily    atorvastatin  40 mg Oral Nightly    ondansetron  4 mg IntraVENous Once     PRN Meds: temazepam, sodium chloride flush, sodium chloride, polyethylene glycol, prochlorperazine, labetalol, meclizine      Physical Exam Performed:      General appearance:  No apparent distress  Respiratory:  Normal respiratory effort.   Cardiovascular:  Regular rate and rhythm.  Abdomen:  Soft, non-tender, non-distended.  Musculoskeletal:  No edema  Neurologic:  Non-focal  Psychiatric:  Alert and oriented    /82   Pulse 61   Temp 97.9 °F (36.6 °C) (Oral)   Resp 14   Ht 1.651 m (5' 5\")   Wt 70.8 kg (156 lb)   SpO2 96%   BMI 25.96 kg/m²     Telemetry:      Personally reviewed and interpreted telemetry (Rhythm Strip) on 1/29/2025 with the following findings: NSR    Diet: ADULT DIET; Regular; Low Fat/Low Chol/High Fiber/ALEX    DVT Prophylaxis: [x]PPx LMWH  []SQ Heparin  []IPC/SCDs  []Eliquis  []Xarelto  []Coumadin  [] Heparin Drip  []Other -      Code status: Full Code    PT/OT Eval Status:   []NOT yet ordered  [x]Ordered and Pending   []Seen with Recommendations for:   [x]Home independently  []Home w/ assist  []HHC  []SNF  []Acute Rehab    Multi-Disciplinary Rounds with Case Management completed on 1/29/2025 with the following recommendations:  Anticipated Discharge Location: [x]Home w/ []HHC vs []SNF  []Acute Rehab  []LTAC  []Hospice  []Other -    Anticipated Discharge Day/Date:   1/29/ 30 pending neuro   Barriers to Discharge:  dizziness w/u   --------------------------------------------------    MDM (any 2 required for High level billing)    A. Problems (any 1)  [] Acute/Chronic Illness/injury posing

## 2025-01-29 NOTE — DISCHARGE SUMMARY
and vertebral arteries without evidence of acute abnormality or stenosis. CTA HEAD: FINDINGS: ANTERIOR CIRCULATION: The intracranial internal carotid arteries, anterior cerebral arteries, and middle cerebral arteries demonstrate no occlusion or stenosis. No evidence for aneurysm or arteriovenous malformation. POSTERIOR CIRCULATION: The bilateral vertebral arteries, basilar artery and posterior cerebral arteries demonstrate no occlusion or stenosis. No evidence for aneurysm or arteriovenous malformation. Right posterior communicating artery is patent. INTRACRANIAL VENOUS SYSTEM: No evidence for intracranial venous thrombosis. IMPRESSION: 1. Patent intracranial vessels. Electronically signed by Cedric Shah MD    CT HEAD WO CONTRAST    Result Date: 1/28/2025  EXAM: CT HEAD WO CONTRAST CLINICAL  INDICATION: Stroke Symptoms COMPARISON: None. TECHNIQUE:CT HEAD WO CONTRAST  This exam was performed according to our departmental dose-optimization program, which includes automated exposure control, adjustment of the mA and/or kV according to patient size and/or use of iterative reconstruction technique.  Unless otherwise specified, incidental findings do not require dedicated imaging follow-up. FINDINGS: The parenchyma shows symmetry of the sulci without hydrocephalus, midline shift, mass lesions or intracranial hemorrhage. Orbital contents are normal. The mastoid air cells are clear. Paranasal sinuses are clear.  Calvarium is without acute changes.     Unremarkable CT brain without contrast. Results phoned in to ordering department provider Helder Rome at time of report. Electronically signed by Alfredo Reyna      Consults:     IP CONSULT TO STROKE TEAM  IP CONSULT TO NEUROLOGY  IP CONSULT TO CASE MANAGEMENT    Labs:     Recent Labs     01/28/25  1857   WBC 8.8   HGB 14.3   HCT 41.8        Recent Labs     01/28/25  1857      K 4.6      CO2 30   BUN 13   CREATININE 1.0   CALCIUM 10.1     Recent Labs

## 2025-01-29 NOTE — ED NOTES
INPATIENT NEUROLOGY CONSULT  RE-TIA eval  [id number]-Paged  through [employer]  [id number]- See Ayesha OTTO note

## 2025-01-29 NOTE — ED NOTES
DC teaching completed by this RN.  Educated on s/s of stroke and seeking treatment.  Medications discussed as well as outpatient lab work.  Pt instructed to F/U with PCP and F/U with neurology in 3-months.  Verbal understanding from pt and wife, no questions at this time.

## 2025-01-29 NOTE — CONSULTS
Inpatient Neurology consult        [facility] Neurology      Alexandre Cassidy  1970    Date of Service: 1/29/2025    Referring Physician: Heather Tabares MD      Reason for the consult and CC: TIA evaluation     HPI:   The patient is a 54 y.o. male with a past medical history of degenerative disc disease, irritable bowel syndrome, and migraines originally presenting to the hospital for concerns of dizziness. Patient states that yesterday (1/28/2025) around 6 AM he developed dizziness while driving home from work and felt he was having difficulties staying in his jadon. Once patient got home, he was getting out of his car and felt generally off balance and reports \"bouncing off walls\" to get into his home. Patient also endorses a \"top heavy\"-head pressure sensation with associated nausea. He then took a nap and reports intensity increased. After arriving to the hospital patient reports relief for 20 minutes twice with a complete resolution in symptoms at 8-10 PM last night (1/28/2025). Patient reports 1-2 headaches/migraines a week and typically takes OTC Tylenol. Patient reports being prescribed Imitrex in the past but had difficulties predicting migraines to take medication. He denies any slurred speech, unilateral weakness, blurred vision, light sensitivity, or sensitivity to sounds. Neurology has been consulted for further evaluation and management of possible TIA.         Constitutional:   Vitals:    01/29/25 0530 01/29/25 0645 01/29/25 0813 01/29/25 1100   BP: 112/75 114/82 115/86 123/80   Pulse: 61 61 62 71   Resp: 14 14 21 16   Temp:  97.9 °F (36.6 °C)  97.9 °F (36.6 °C)   TempSrc:  Oral  Oral   SpO2: 100% 96% 98% 96%   Weight:       Height:             I personally reviewed and updated social history, past medical history, medications, allergy, surgical history, and family history as documented in the patient's electronic health records.       ROS: 10-14 ROS reviewed with the

## 2025-01-29 NOTE — ED NOTES
Pt states that he does not currently feel dizzy.  Symptoms that he presented to ED with have resolved.  Updates RN that he would like to be discharged if possible, has a 'very important work meeting.'

## 2025-01-29 NOTE — ED NOTES
Breakfast tray delivered to patient and patient sitting at bedside eating, no further needs. Call light in reach.

## 2025-01-29 NOTE — CARE COORDINATION
Case Management Assessment  Initial Evaluation    Date/Time of Evaluation: 1/29/2025 9:18 AM  Assessment Completed by: MISTY Bernstein    If patient is discharged prior to next notation, then this note serves as note for discharge by case management.    Patient Name: Alexandre Cassidy                   YOB: 1970  Diagnosis: TIA (transient ischemic attack) [G45.9]                   Date / Time: 1/28/2025  5:56 PM    Patient Admission Status: Observation   Readmission Risk (Low < 19, Mod (19-27), High > 27): No data recorded  Current PCP: Ajay Florentino MD  PCP verified by CM? (P) Yes    Chart Reviewed: Yes      History Provided by: (P) Patient  Patient Orientation: (P) Alert and Oriented    Patient Cognition: (P) Alert    Hospitalization in the last 30 days (Readmission):  No    If yes, Readmission Assessment in  Navigator will be completed.    Advance Directives:      Code Status: Full Code   Patient's Primary Decision Maker is: (P) Legal Next of Kin      Discharge Planning:    Patient lives with: (P) Spouse/Significant Other, Children Type of Home: (P) House  Primary Care Giver: (P) Self  Patient Support Systems include: (P) Spouse/Significant Other, Children   Current Financial resources: (P) None  Current community resources: (P) None  Current services prior to admission: (P) None            Current DME:              Type of Home Care services:  (P) None    ADLS  Prior functional level: (P) Independent in ADLs/IADLs  Current functional level: (P) Independent in ADLs/IADLs    PT AM-PAC:   /24  OT AM-PAC:   /24    Family can provide assistance at DC: (P) Yes  Would you like Case Management to discuss the discharge plan with any other family members/significant others, and if so, who? (P) No  Plans to Return to Present Housing: (P) Yes  Other Identified Issues/Barriers to RETURNING to current housing: none noted  Potential Assistance needed at discharge: (P) N/A            Potential DME:

## 2025-01-30 ENCOUNTER — TELEPHONE (OUTPATIENT)
Dept: ADMINISTRATIVE | Age: 55
End: 2025-01-30

## 2025-01-30 NOTE — TELEPHONE ENCOUNTER
Resubmitted PA for [facility] Via [facility] Key: [id number] STATUS: APPROVED 1/30/2025-1/30/2026.    Authorization Expiration Date: 1/29/2026.    If this requires a response please respond to the pool ( P [facility] P[facility] MEDICATION PRE-AUTH).      Thank you please advise patient.

## 2025-01-30 NOTE — TELEPHONE ENCOUNTER
Submitted PA for [facility] Via [facility] Key: [id number] STATUS: PENDING.    Follow up done daily; if no decision with in three days we will refax.  If another three days goes by with no decision will call the insurance for status.

## 2025-02-03 ENCOUNTER — TELEPHONE (OUTPATIENT)
Dept: NEUROLOGY | Age: 55
End: 2025-02-03

## 2025-02-03 NOTE — TELEPHONE ENCOUNTER
Received denial letter from [employer] re: [employer]dee ODT.      Letter requesting more info.  Letter scanned to media.